# Patient Record
Sex: FEMALE | Race: WHITE
[De-identification: names, ages, dates, MRNs, and addresses within clinical notes are randomized per-mention and may not be internally consistent; named-entity substitution may affect disease eponyms.]

---

## 2018-12-29 ENCOUNTER — HOSPITAL ENCOUNTER (INPATIENT)
Dept: HOSPITAL 80 - FED | Age: 68
LOS: 2 days | Discharge: HOME | DRG: 194 | End: 2018-12-31
Attending: INTERNAL MEDICINE | Admitting: INTERNAL MEDICINE
Payer: COMMERCIAL

## 2018-12-29 DIAGNOSIS — J10.1: Primary | ICD-10-CM

## 2018-12-29 DIAGNOSIS — E87.1: ICD-10-CM

## 2018-12-29 DIAGNOSIS — K21.9: ICD-10-CM

## 2018-12-29 DIAGNOSIS — Z87.891: ICD-10-CM

## 2018-12-29 DIAGNOSIS — R06.89: ICD-10-CM

## 2018-12-29 DIAGNOSIS — J45.901: ICD-10-CM

## 2018-12-29 LAB — PLATELET # BLD: 296 10^3/UL (ref 150–400)

## 2018-12-29 PROCEDURE — G0378 HOSPITAL OBSERVATION PER HR: HCPCS

## 2018-12-29 RX ADMIN — ACETAMINOPHEN PRN MG: 325 TABLET ORAL at 15:41

## 2018-12-29 RX ADMIN — GUAIFENESIN SCH MG: 600 TABLET, EXTENDED RELEASE ORAL at 20:24

## 2018-12-29 RX ADMIN — IPRATROPIUM BROMIDE AND ALBUTEROL SULFATE SCH ML: .5; 3 SOLUTION RESPIRATORY (INHALATION) at 16:58

## 2018-12-29 RX ADMIN — OSELTAMIVIR PHOSPHATE SCH MG: 75 CAPSULE ORAL at 18:18

## 2018-12-29 RX ADMIN — IPRATROPIUM BROMIDE AND ALBUTEROL SULFATE SCH ML: .5; 3 SOLUTION RESPIRATORY (INHALATION) at 21:00

## 2018-12-29 NOTE — PDGENHP
History and Physical





- Chief Complaint


shortness of breath





- History of Present Illness


67yo F with history of asthma presents with 5 days of worsening shortness of 

breath. Symptoms started x-mas jamir. She was visiting family in [place] and her 

grandson was sick. Developed difficulty breathing, cough productive of whitish 

sputum, fevers, chills, body aches, headache that has worsened since then. 

Using her albuterol inhaler more frequently (up to 5-6 times/day). Check her O2 

sat at home and was in the 60s when she awoke and increased to 87% after taking 

some deep breaths. This prompted her to come to the ED where she was found to 

test + for influenza A. She was sating 86% on room air on arrival that improved 

with 1L oxygen. She is being admitted for further management.





History Information





- Allergies/Home Medication List


Allergies/Adverse Reactions: 








Penicillins Allergy (Verified 12/29/18 11:08)


 





Home Medications: 








Albuterol Sulfate [Proair Hfa] 2 puffs IH Q4H PRN 12/29/18 [Last Taken Unknown]


Cetirizine [ZyrTEC 10 mg (*)] 10 mg PO DAILY 12/29/18 [Last Taken Unknown]


Esomeprazole Mag Trihydrate [Nexium] 40 mg PO DAILY 12/29/18 [Last Taken Unknown

]


Fluticasone Hfa 220 Mcg [Flovent 220 MCG Hfa MDI (*)] 1 puffs IH BID 12/29/18 [

Last Taken Unknown]





I have personally reviewed and updated: family history, medical history, social 

history, surgical history





- Past Medical History


Additional medical history: asthma (never intubated, infrequent exacerbations, 

briefly required supplemental O2 in spring 2018 for mild exacerbation), gerd





- Surgical History


Additional surgical history: appendectomy





- Family History


Positive for: non-pertinent





- Social History


Smoking Status: Former smoker


Alcohol Use: Rarely


Drug Use: None


Additional social history: Lives with  near [place], independent in 

ADLs





Review of Systems


Review of Systems: 





ROS: 10pt was reviewed & negative except for what was stated in HPI & below





Physical Exam


Physical Exam: 

















Temp Pulse Resp BP Pulse Ox


 


 36.6 C   95   24 H  144/90 H  95 


 


 12/29/18 11:09  12/29/18 13:13  12/29/18 13:13  12/29/18 13:13  12/29/18 13:13




















O2 (L/minute)                  2














Constitutional: appears nourished, uncomfortable


Eyes: PERRL, anicteric sclera, EOMI


Ears, Nose, Mouth, Throat: moist mucous membranes, hearing normal, ears appear 

normal, no oral mucosal ulcers


Cardiovascular: regular rate and rhythym, no murmur, rub, or gallop, No edema


Respiratory: no respiratory distress, reduced air movement (throughout), 

expiratory wheeze, rhonchi


Gastrointestinal: normoactive bowel sounds, soft, non-tender abdomen, no 

palpable masses


Genitourinary: no bladder fullness, no bladder tenderness


Skin: warm, normal color, no rashes or abrasions, no fluctuance, no induration, 

No mottled


Musculoskeletal: full muscle strength, no muscle tenderness, normal joint ROM, 

no joint effusions


Neurologic: AAOx3


Psychiatric: interacting appropriately, not anxious, not encephalopathic, 

thought process linear





Lab Data & Imaging Review





 12/29/18 11:30





 12/29/18 Unknown














WBC  5.65 10^3/uL (3.80-9.50)   12/29/18  11:30    


 


RBC  5.17 10^6/uL (4.18-5.33)   12/29/18  11:30    


 


Hgb  13.5 g/dL (12.6-16.3)   12/29/18  11:30    


 


Hct  41.2 % (38.0-47.0)   12/29/18  11:30    


 


MCV  79.7 fL (81.5-99.8)  L  12/29/18  11:30    


 


MCH  26.1 pg (27.9-34.1)  L  12/29/18  11:30    


 


MCHC  32.8 g/dL (32.4-36.7)   12/29/18  11:30    


 


RDW  15.0 % (11.5-15.2)   12/29/18  11:30    


 


Plt Count  296 10^3/uL (150-400)   12/29/18  11:30    


 


MPV  8.8 fL (8.7-11.7)   12/29/18  11:30    


 


Neut % (Auto)  77.7 % (39.3-74.2)  H  12/29/18  11:30    


 


Lymph % (Auto)  14.3 % (15.0-45.0)  L  12/29/18  11:30    


 


Mono % (Auto)  7.6 % (4.5-13.0)   12/29/18  11:30    


 


Eos % (Auto)  0.0 % (0.6-7.6)  L  12/29/18  11:30    


 


Baso % (Auto)  0.2 % (0.3-1.7)  L  12/29/18  11:30    


 


Nucleat RBC Rel Count  0.0 % (0.0-0.2)   12/29/18  11:30    


 


Absolute Neuts (auto)  4.39 10^3/uL (1.70-6.50)   12/29/18  11:30    


 


Absolute Lymphs (auto)  0.81 10^3/uL (1.00-3.00)  L  12/29/18  11:30    


 


Absolute Monos (auto)  0.43 10^3/uL (0.30-0.80)   12/29/18  11:30    


 


Absolute Eos (auto)  0.00 10^3/uL (0.03-0.40)  L  12/29/18  11:30    


 


Absolute Basos (auto)  0.01 10^3/uL (0.02-0.10)  L  12/29/18  11:30    


 


Absolute Nucleated RBC  0.00 10^3/uL (0-0.01)   12/29/18  11:30    


 


Immature Gran %  0.2 % (0.0-1.1)   12/29/18  11:30    


 


Immature Gran #  0.01 10^3/uL (0.00-0.10)   12/29/18  11:30    


 


VBG Lactic Acid  1.0 mmol/L (0.7-2.1)   12/29/18  11:30    


 


Sodium  132 mEq/L (135-145)  L  12/29/18  Unknown


 


Potassium  3.5 mEq/L (3.5-5.2)   12/29/18  Unknown


 


Chloride  98 mEq/L ()   12/29/18  Unknown


 


Carbon Dioxide  25 mEq/l (22-31)   12/29/18  Unknown


 


Anion Gap  9 mEq/L (6-14)   12/29/18  Unknown


 


BUN  12 mg/dL (7-23)   12/29/18  Unknown


 


Creatinine  0.6 mg/dL (0.6-1.0)   12/29/18  Unknown


 


Estimated GFR  > 60   12/29/18  Unknown


 


Glucose  111 mg/dL ()  H  12/29/18  Unknown


 


Calcium  9.1 mg/dL (8.5-10.4)   12/29/18  Unknown


 


Nasal Influenza A PCR  FLU A DETECTED  (NEGATIVE)  H  12/29/18  11:30    


 


Nasal Influenza B PCR  NEGATIVE FOR FLU B  (NEGATIVE)   12/29/18  11:30    








Visualized and Interpreted Chest x-ray results: Yes


Interpretation: CXR: suggestive of bronchitis, no focal infiltrate or effusion





Assessment & Plan


Assessment: 


67yo F with history of asthma presents with 5 days of worsening shortness of 

breath found to have influenza A and mild hypoxemia.


Plan: 





1. Acute asthma exacerbation: Due to viral process.


   - Schedule duonebs, prednisone 40mg daily, cough suppressants





2. Influenza A infection


   - Tamiflu 75mg BID x5 days, supportive care





3. Acute hypoxemic respiratory insufficiency: Requiring 1-2L


   - Mgmt as above, wean as able





4. GERD: Cont home PPI





VTE ppx: LMHW


Code: full, ok with intubation if necessary


Diet: regular


Dispo: Admit under observation

## 2018-12-29 NOTE — EDPHY
HPI/HX/ROS/PE/MDM


Narrative: 





CHIEF COMPLAINT:  Fever, cough 





HPI: The patient is a 68-year-old female with no significant past medical 

history who presents with 5 days of fever, cough, nausea, vomiting, diarrhea, 

headache and generalized body aches.  She recently returned from visiting 

family in [place] where a child was present who had similar symptoms.  The 

patient checked her pulse ox overnight and found it to be mid 60s.  She denies 

chest pain or abdominal pain.





REVIEW OF SYSTEMS:


Aside from elements discussed in the HPI, a comprehensive 10-point review of 

systems was reviewed and is negative.





PMH:  History of smoking and some chronic cough but no official diagnosis of 

COPD.





SOCIAL HISTORY:  Denies drug abuse.  .





PHYSICAL EXAM:


General:Patient is alert, in no acute distress.


ENT:Eyes are normal to inspection.  ENT inspection normal.


Neck: Normal inspection.  Full range of motion.


Respiratory:No respiratory distress.  Breath sounds normal bilaterally.


Cardiovascular: Regular rate and rhythm.  Strong peripheral pulses.  Normal cap 

refill.


Abdomen:The abdomen is nontender to palpation. There are no peritoneal signs. 

There are normal bowel sounds.


Back: Normal to inspection.  No tenderness to palpation.


Skin: Normal color.  No rash.  Warm and dry.


Extremities: Normal appearance.  Full range of motion.


Neuro: Oriented x3.  Normal motor function.  Normal sensory function.


ED Course: 





Patient is positive for Flu A and becomes hypoxemic down to 85% while on room 

air. Recommended admission, which she agrees to.





Spoke with hospitalist service. Dr. Leo accepts admission. 





- Data Points


Imaging Results: 


 Imaging Impressions





Chest X-Ray  12/29/18 12:19


Impression: Mild peribronchial wall thickening which can be seen with 

bronchitis. No other findings for acute cardiopulmonary abnormality.











Imaging: I viewed and interpreted images myself


Laboratory Results: 


 Laboratory Results





 12/29/18 11:30 





 12/29/18 Unknown 





 











  12/29/18 12/29/18 12/29/18





  Unknown 11:30 11:30


 


WBC    5.65 10^3/uL 10^3/uL  





    (3.80-9.50)  


 


RBC    5.17 10^6/uL 10^6/uL  





    (4.18-5.33)  


 


Hgb    13.5 g/dL g/dL  





    (12.6-16.3)  


 


Hct    41.2 % %  





    (38.0-47.0)  


 


MCV    79.7 fL L fL  





    (81.5-99.8)  


 


MCH    26.1 pg L pg  





    (27.9-34.1)  


 


MCHC    32.8 g/dL g/dL  





    (32.4-36.7)  


 


RDW    15.0 % %  





    (11.5-15.2)  


 


Plt Count    296 10^3/uL 10^3/uL  





    (150-400)  


 


MPV    8.8 fL fL  





    (8.7-11.7)  


 


Neut % (Auto)    77.7 % H %  





    (39.3-74.2)  


 


Lymph % (Auto)    14.3 % L %  





    (15.0-45.0)  


 


Mono % (Auto)    7.6 % %  





    (4.5-13.0)  


 


Eos % (Auto)    0.0 % L %  





    (0.6-7.6)  


 


Baso % (Auto)    0.2 % L %  





    (0.3-1.7)  


 


Nucleat RBC Rel Count    0.0 % %  





    (0.0-0.2)  


 


Absolute Neuts (auto)    4.39 10^3/uL 10^3/uL  





    (1.70-6.50)  


 


Absolute Lymphs (auto)    0.81 10^3/uL L 10^3/uL  





    (1.00-3.00)  


 


Absolute Monos (auto)    0.43 10^3/uL 10^3/uL  





    (0.30-0.80)  


 


Absolute Eos (auto)    0.00 10^3/uL L 10^3/uL  





    (0.03-0.40)  


 


Absolute Basos (auto)    0.01 10^3/uL L 10^3/uL  





    (0.02-0.10)  


 


Absolute Nucleated RBC    0.00 10^3/uL 10^3/uL  





    (0-0.01)  


 


Immature Gran %    0.2 % %  





    (0.0-1.1)  


 


Immature Gran #    0.01 10^3/uL 10^3/uL  





    (0.00-0.10)  


 


VBG Lactic Acid      1.0 mmol/L mmol/L





     (0.7-2.1) 


 


Sodium  132 mEq/L L mEq/L    





   (135-145)   


 


Potassium  3.5 mEq/L mEq/L    





   (3.5-5.2)   


 


Chloride  98 mEq/L mEq/L    





   ()   


 


Carbon Dioxide  25 mEq/l mEq/l    





   (22-31)   


 


Anion Gap  9 mEq/L mEq/L    





   (6-14)   


 


BUN  12 mg/dL mg/dL    





   (7-23)   


 


Creatinine  0.6 mg/dL mg/dL    





   (0.6-1.0)   


 


Estimated GFR  > 60     





    


 


Glucose  111 mg/dL H mg/dL    





   ()   


 


Calcium  9.1 mg/dL mg/dL    





   (8.5-10.4)   


 


Nasal Influenza A PCR      





    


 


Nasal Influenza B PCR      





    














  12/29/18





  11:30


 


WBC  





  


 


RBC  





  


 


Hgb  





  


 


Hct  





  


 


MCV  





  


 


MCH  





  


 


MCHC  





  


 


RDW  





  


 


Plt Count  





  


 


MPV  





  


 


Neut % (Auto)  





  


 


Lymph % (Auto)  





  


 


Mono % (Auto)  





  


 


Eos % (Auto)  





  


 


Baso % (Auto)  





  


 


Nucleat RBC Rel Count  





  


 


Absolute Neuts (auto)  





  


 


Absolute Lymphs (auto)  





  


 


Absolute Monos (auto)  





  


 


Absolute Eos (auto)  





  


 


Absolute Basos (auto)  





  


 


Absolute Nucleated RBC  





  


 


Immature Gran %  





  


 


Immature Gran #  





  


 


VBG Lactic Acid  





  


 


Sodium  





  


 


Potassium  





  


 


Chloride  





  


 


Carbon Dioxide  





  


 


Anion Gap  





  


 


BUN  





  


 


Creatinine  





  


 


Estimated GFR  





  


 


Glucose  





  


 


Calcium  





  


 


Nasal Influenza A PCR  FLU A DETECTED  H 





   (NEGATIVE) 


 


Nasal Influenza B PCR  NEGATIVE FOR FLU B 





   (NEGATIVE) 











Medications Given: 


 








Discontinued Medications





Albuterol/Ipratropium (Duoneb)  3 ml IH EDNOW ONE


   Stop: 12/29/18 11:56


   Last Admin: 12/29/18 11:57 Dose:  3 ml








General


Time Seen by Provider: 12/29/18 11:02


Initial Vital Signs: 


 Initial Vital Signs











Temperature (C)  36.6 C   12/29/18 11:09


 


Heart Rate  103 H  12/29/18 11:09


 


Respiratory Rate  22 H  12/29/18 11:09


 


Blood Pressure  132/98 H  12/29/18 11:09


 


O2 Sat (%)  86 L  12/29/18 11:09








 











O2 Delivery Mode               Nasal Cannula,Humidified


 


O2 (L/minute)                  3














Allergies/Adverse Reactions: 


 





Penicillins Allergy (Verified 12/29/18 11:08)


 








Home Medications: 














 Medication  Instructions  Recorded


 


Albuterol Sulfate [Proair Hfa] 2 puffs IH Q4H PRN 12/29/18


 


Cetirizine [ZyrTEC 10 mg (*)] 10 mg PO DAILY 12/29/18


 


Esomeprazole Mag Trihydrate 40 mg PO DAILY 12/29/18





[Nexium]  


 


Fluticasone Hfa 220 Mcg [Flovent 1 puffs IH BID 12/29/18





220 MCG Hfa MDI (*)]  














Departure





- Departure


Disposition: [facility]


Clinical Impression: 


 Influenza A, Hypoxemia





Condition: Fair

## 2018-12-30 LAB — PLATELET # BLD: 288 10^3/UL (ref 150–400)

## 2018-12-30 RX ADMIN — OSELTAMIVIR PHOSPHATE SCH MG: 75 CAPSULE ORAL at 17:56

## 2018-12-30 RX ADMIN — IPRATROPIUM BROMIDE AND ALBUTEROL SULFATE SCH ML: .5; 3 SOLUTION RESPIRATORY (INHALATION) at 17:08

## 2018-12-30 RX ADMIN — GUAIFENESIN SCH MG: 600 TABLET, EXTENDED RELEASE ORAL at 20:06

## 2018-12-30 RX ADMIN — ENOXAPARIN SODIUM SCH MG: 100 INJECTION SUBCUTANEOUS at 07:51

## 2018-12-30 RX ADMIN — OSELTAMIVIR PHOSPHATE SCH MG: 75 CAPSULE ORAL at 07:51

## 2018-12-30 RX ADMIN — IPRATROPIUM BROMIDE AND ALBUTEROL SULFATE SCH ML: .5; 3 SOLUTION RESPIRATORY (INHALATION) at 11:22

## 2018-12-30 RX ADMIN — CETIRIZINE HYDROCHLORIDE SCH MG: 10 TABLET, FILM COATED ORAL at 07:51

## 2018-12-30 RX ADMIN — ACETAMINOPHEN PRN MG: 325 TABLET ORAL at 17:56

## 2018-12-30 RX ADMIN — IPRATROPIUM BROMIDE AND ALBUTEROL SULFATE SCH ML: .5; 3 SOLUTION RESPIRATORY (INHALATION) at 05:26

## 2018-12-30 RX ADMIN — IPRATROPIUM BROMIDE AND ALBUTEROL SULFATE SCH ML: .5; 3 SOLUTION RESPIRATORY (INHALATION) at 21:11

## 2018-12-30 RX ADMIN — GUAIFENESIN SCH MG: 600 TABLET, EXTENDED RELEASE ORAL at 07:51

## 2018-12-30 RX ADMIN — PANTOPRAZOLE SODIUM SCH MG: 40 TABLET, DELAYED RELEASE ORAL at 07:51

## 2018-12-30 NOTE — HOSPPROG
Hospitalist Progress Note


Assessment/Plan: 





67yo F with history of asthma presents with 5 days of worsening shortness of 

breath found to have influenza A and hypoxemia.





1. Acute asthma exacerbation: Better but still with significant obstructive 

lung physiology


   - Continue scheduled duonebs, prednisone 40mg daily, cough suppressants





2. Influenza A infection


   - Tamiflu 75mg BID x5 days, supportive care





3. Acute hypoxemic respiratory insufficiency: Requirements up a bit since admit

, holding at 3L


   - Mgmt as above, wean as able, may need supplemental O2 at discharge





4. GERD: Cont home PPI





VTE ppx: LMHW


Code: full, ok with intubation if necessary


Diet: regular


Dispo: Switch to inpatient, still requiring scheduled bronchodilators and 

wheezing. Hopeful to discharge tomorrow, possibly with O2


Subjective: Much better, still on 3L, no fevers. Coughing.


Objective: 


 Vital Signs











Temp Pulse Resp BP Pulse Ox


 


 37.1 C   90   18   113/73   91 L


 


 12/30/18 08:00  12/30/18 08:00  12/30/18 08:00  12/30/18 08:00  12/30/18 08:00








 Laboratory Results





 12/30/18 05:00 





 12/30/18 05:00 





 











 12/29/18 12/30/18 12/31/18





 05:59 05:59 05:59


 


Intake Total  840 


 


Balance  840 














- Physical Exam


Constitutional: no apparent distress, appears nourished, not in pain


Eyes: PERRL, anicteric sclera, EOMI


Ears, Nose, Mouth, Throat: moist mucous membranes, hearing normal, ears appear 

normal, no oral mucosal ulcers


Cardiovascular: regular rate and rhythym, no murmur, rub, or gallop


Respiratory: no respiratory distress, reduced air movement, expiratory wheeze


Gastrointestinal: normoactive bowel sounds, soft, non-tender abdomen, no 

palpable masses


Genitourinary: no bladder fullness, no bladder tenderness, no renal bruits


Skin: no rashes or abrasions, no fluctuance, no induration


Musculoskeletal: full muscle strength, no muscle tenderness, normal joint ROM


Neurologic: AAOx3, sensation intact bilaterally


Psychiatric: interacting appropriately, not anxious, not encephalopathic, 

thought process linear





ICD10 Worksheet


Patient Problems: 


 Problems











Problem Status Onset


 


Hypoxemia Acute  


 


Influenza A Acute

## 2018-12-30 NOTE — ASMTCMCOM
ISHA Note

 

CM Note                       

Notes:

67yo female admitted for Influenza, Hypoxemia, Asthma exascerbation.  She has a Hx of 

Asthma, GERD. She lives with her  in [place]. CM doesn't anticipate patient will have 

discharge needs.

 

Date Signed:  12/30/2018 02:50 PM

Electronically Signed By:Meredith Muñoz LCSW

## 2018-12-30 NOTE — PDMN
Medical Necessity


Medical necessity: Change to inpt as of 12/30/18 @ 11:35. Pt meets inpt 

criteria per MD order and MCG M-60, Asthma. 69 y/o w/hx asthma admitted w/

influenza A and acute asthma exacerbation, hypoxemia. Upgraded to inpt today 

for persistent hypoxemic resp insufficiency/wheezing, requiring 3L suppl O2 (up 

from 2L upon admit) and scheduled duonebs. Est LOS>2MN for management of above.

## 2018-12-31 VITALS — DIASTOLIC BLOOD PRESSURE: 77 MMHG | SYSTOLIC BLOOD PRESSURE: 135 MMHG

## 2018-12-31 RX ADMIN — OSELTAMIVIR PHOSPHATE SCH MG: 75 CAPSULE ORAL at 08:45

## 2018-12-31 RX ADMIN — IPRATROPIUM BROMIDE AND ALBUTEROL SULFATE SCH ML: .5; 3 SOLUTION RESPIRATORY (INHALATION) at 12:40

## 2018-12-31 RX ADMIN — CETIRIZINE HYDROCHLORIDE SCH MG: 10 TABLET, FILM COATED ORAL at 08:45

## 2018-12-31 RX ADMIN — ENOXAPARIN SODIUM SCH MG: 100 INJECTION SUBCUTANEOUS at 08:48

## 2018-12-31 RX ADMIN — IPRATROPIUM BROMIDE AND ALBUTEROL SULFATE SCH ML: .5; 3 SOLUTION RESPIRATORY (INHALATION) at 04:29

## 2018-12-31 RX ADMIN — IPRATROPIUM BROMIDE AND ALBUTEROL SULFATE SCH ML: .5; 3 SOLUTION RESPIRATORY (INHALATION) at 15:33

## 2018-12-31 RX ADMIN — PANTOPRAZOLE SODIUM SCH MG: 40 TABLET, DELAYED RELEASE ORAL at 08:45

## 2018-12-31 RX ADMIN — GUAIFENESIN SCH MG: 600 TABLET, EXTENDED RELEASE ORAL at 08:46

## 2018-12-31 NOTE — PDHOMEO2F
Home Oxygen Face to Face


Home Orders: 


I certify that a physician or a nurse practitioner or physician's assistant has 

had a face-to-face encounter with this patient on the date of this order due to 

the diagnosis listed, which relates to the primary reason the patient requires 

home oxygen. Alternative treatments have been tried, or considered, and deemed 

ineffective. It is anticipated that supplemental oxygen will result in 

improvement with treatment.





Home oxygen qualifying diagnosis: respiratory failure with hypoxia


SpO2 on room air (%): 85


Frequency of home oxygen needed: continuous


Home oxygen liters per minute: 2


Home oxygen delivery device: nasal cannula


Concentrator: Yes


E-tanks for mobility and back up: Yes


If ordering portable O2, is the patient mobile in the home?: Yes


I certify that, based on these findings, the home oxygen is medically necessary 

for this patient for the following length of time.





Length of time home oxygen needed: 3 months

## 2018-12-31 NOTE — PDDCSUM
Discharge Summary


Discharge Summary: 





Date of Admission: 12/29/2018


Date of Discharge: 12/31/2018





Studies:


1. CXR





Discharge Diagnoses:


1. Acute hypoxemic respiratory insufficiency 


2. Acute asthma exacerbation


3. Influenza A infection


4. Hyponatremia, resolved


5. GERD 





Brief Hospital Course:


69yo F with history of asthma presented with 5 days of worsening shortness of 

breath found to have influenza A and hypoxemia. She was treated with tamiflu 

and also treated for an asthma exacerbation with bronchodilators and steroids 

with clinical improvement. She was discharged with 3L of supplemental oxygen. 





Medications: Please refer to EMR for complete list. I sent a prescription for 

tamiflu and prednisone to patient's pharmacy.





Follow Up Plan:


1. To see PCP in 1 week to monitor for resolution of symptoms and assess need 

for O2





Physical Exam: Vitals reviewed, afebrile and normotensive. Alert and oriented, 

RRR without m/r/g, lungs with improved aeration and minimal wheezes, abdomen 

soft, no rashes, no edema.